# Patient Record
Sex: MALE | Race: WHITE | NOT HISPANIC OR LATINO | Employment: FULL TIME | URBAN - METROPOLITAN AREA
[De-identification: names, ages, dates, MRNs, and addresses within clinical notes are randomized per-mention and may not be internally consistent; named-entity substitution may affect disease eponyms.]

---

## 2023-10-25 PROBLEM — K64.9 HEMORRHOIDS: Status: ACTIVE | Noted: 2023-10-25

## 2023-10-25 PROBLEM — M54.2 NECK PAIN: Status: ACTIVE | Noted: 2023-10-25

## 2023-10-25 PROBLEM — M25.511 BILATERAL SHOULDER PAIN: Status: ACTIVE | Noted: 2023-10-25

## 2023-10-25 PROBLEM — M99.01 SEGMENTAL AND SOMATIC DYSFUNCTION OF CERVICAL REGION: Status: ACTIVE | Noted: 2023-10-25

## 2023-10-25 PROBLEM — J30.9 ALLERGIC RHINITIS: Status: ACTIVE | Noted: 2023-10-25

## 2023-10-25 PROBLEM — M77.8 FOREARM TENDONITIS: Status: ACTIVE | Noted: 2023-10-25

## 2023-10-25 PROBLEM — E78.5 DYSLIPIDEMIA: Status: ACTIVE | Noted: 2023-10-25

## 2023-10-25 PROBLEM — R06.09 DYSPNEA ON EXERTION: Status: ACTIVE | Noted: 2023-10-25

## 2023-10-25 PROBLEM — K59.00 CONSTIPATION: Status: ACTIVE | Noted: 2023-10-25

## 2023-10-25 PROBLEM — G44.209 MUSCLE TENSION HEADACHE: Status: ACTIVE | Noted: 2023-10-25

## 2023-10-25 PROBLEM — K21.9 GERD (GASTROESOPHAGEAL REFLUX DISEASE): Status: ACTIVE | Noted: 2023-10-25

## 2023-10-25 PROBLEM — E55.9 VITAMIN D DEFICIENCY: Status: ACTIVE | Noted: 2023-10-25

## 2023-10-25 PROBLEM — J01.90 SINUSITIS, ACUTE: Status: ACTIVE | Noted: 2023-10-25

## 2023-10-25 PROBLEM — N52.9 ERECTILE DYSFUNCTION: Status: ACTIVE | Noted: 2023-10-25

## 2023-10-25 PROBLEM — K58.0 IRRITABLE BOWEL SYNDROME WITH DIARRHEA: Status: ACTIVE | Noted: 2023-10-25

## 2023-10-25 PROBLEM — K60.2 ANAL FISSURE: Status: ACTIVE | Noted: 2023-10-25

## 2023-10-25 PROBLEM — R53.82 CHRONIC FATIGUE: Status: ACTIVE | Noted: 2023-10-25

## 2023-10-25 PROBLEM — R68.82 DECREASED SEX DRIVE: Status: ACTIVE | Noted: 2023-10-25

## 2023-10-25 PROBLEM — M25.512 BILATERAL SHOULDER PAIN: Status: ACTIVE | Noted: 2023-10-25

## 2023-10-25 PROBLEM — R73.09 ELEVATED GLUCOSE: Status: ACTIVE | Noted: 2023-10-25

## 2023-10-25 PROBLEM — K64.8 INTERNAL HEMORRHOIDS: Status: ACTIVE | Noted: 2023-10-25

## 2023-10-25 PROBLEM — K52.9 CHRONIC DIARRHEA: Status: ACTIVE | Noted: 2023-10-25

## 2023-10-25 PROBLEM — M72.2 PLANTAR FASCIITIS, BILATERAL: Status: ACTIVE | Noted: 2023-10-25

## 2023-10-26 ENCOUNTER — OFFICE VISIT (OUTPATIENT)
Dept: PRIMARY CARE | Facility: CLINIC | Age: 43
End: 2023-10-26
Payer: COMMERCIAL

## 2023-10-26 VITALS
DIASTOLIC BLOOD PRESSURE: 70 MMHG | SYSTOLIC BLOOD PRESSURE: 116 MMHG | WEIGHT: 201 LBS | BODY MASS INDEX: 33.45 KG/M2 | TEMPERATURE: 96.5 F | OXYGEN SATURATION: 97 % | HEART RATE: 82 BPM

## 2023-10-26 DIAGNOSIS — Z13.220 NEED FOR LIPID SCREENING: ICD-10-CM

## 2023-10-26 DIAGNOSIS — R10.31 GROIN PAIN, RIGHT: ICD-10-CM

## 2023-10-26 DIAGNOSIS — H60.541 ACUTE ECZEMATOID OTITIS EXTERNA OF RIGHT EAR: ICD-10-CM

## 2023-10-26 DIAGNOSIS — Z76.89 ENCOUNTER TO ESTABLISH CARE: Primary | ICD-10-CM

## 2023-10-26 DIAGNOSIS — Z87.19 HISTORY OF RIGHT INGUINAL HERNIA: ICD-10-CM

## 2023-10-26 PROCEDURE — 1036F TOBACCO NON-USER: CPT | Performed by: STUDENT IN AN ORGANIZED HEALTH CARE EDUCATION/TRAINING PROGRAM

## 2023-10-26 PROCEDURE — 3008F BODY MASS INDEX DOCD: CPT | Performed by: STUDENT IN AN ORGANIZED HEALTH CARE EDUCATION/TRAINING PROGRAM

## 2023-10-26 PROCEDURE — 99204 OFFICE O/P NEW MOD 45 MIN: CPT | Performed by: STUDENT IN AN ORGANIZED HEALTH CARE EDUCATION/TRAINING PROGRAM

## 2023-10-26 RX ORDER — DESIPRAMINE HYDROCHLORIDE 25 MG/1
25 TABLET ORAL DAILY
COMMUNITY
End: 2024-05-16

## 2023-10-26 RX ORDER — FLUOCINOLONE ACETONIDE 0.11 MG/ML
5 OIL AURICULAR (OTIC) 2 TIMES DAILY
Qty: 20 ML | Refills: 0 | Status: SHIPPED | OUTPATIENT
Start: 2023-10-26 | End: 2023-11-02

## 2023-10-26 RX ORDER — NEOMYCIN SULFATE, POLYMYXIN B SULFATE AND HYDROCORTISONE 10; 3.5; 1 MG/ML; MG/ML; [USP'U]/ML
SUSPENSION/ DROPS AURICULAR (OTIC)
COMMUNITY
Start: 2023-08-04

## 2023-10-26 ASSESSMENT — ENCOUNTER SYMPTOMS: ABDOMINAL PAIN: 1

## 2023-10-26 NOTE — ASSESSMENT & PLAN NOTE
Discussed suspicion of right ear eczema from previous steroid use. Given dermotic drops to soothe and prevent progression of eczema

## 2023-10-26 NOTE — PROGRESS NOTES
Subjective   Patient ID: Giovany Littlejohn is a 43 y.o. male who presents for Earache (LEFT IS BETTER, RIGHT WORSE) and HERNIA.    HPI     [facility] for right ear infection.   Given Corticosporin otic drops. Patient completed the course of antibiotics given. However since August patient has been experience right ear irritation with discharge and itching/pain.  Has not taken any OTC medication to treat it.   Patient has a history of inguinal hernia on the right side.   Recently was doing heavy lifting when he experienced shooting pain into groin and testicle. Hasn't noted any increased in size, redness or bulging.   He continues to have tenderness on palpation.        Review of Systems   HENT:  Positive for ear pain.    Gastrointestinal:  Positive for abdominal pain.   All other systems reviewed and are negative.      Objective   /70   Pulse 82   Temp 35.8 °C (96.5 °F)   Wt 91.2 kg (201 lb)   SpO2 97%   BMI 33.45 kg/m²     Physical Exam  Constitutional:       Appearance: Normal appearance.   HENT:      Head: Normocephalic and atraumatic.      Right Ear: Tympanic membrane, ear canal and external ear normal.      Left Ear: Tympanic membrane, ear canal and external ear normal.      Ears:      Comments: Right sided wax and dried skin  No canal erythema, no bulging, no fluid noted in front or behind TM.  No difficulty with congestion or ear popping     Nose: Nose normal. No congestion or rhinorrhea.   Eyes:      General: No scleral icterus.     Extraocular Movements: Extraocular movements intact.      Conjunctiva/sclera: Conjunctivae normal.   Pulmonary:      Effort: Pulmonary effort is normal.   Abdominal:      General: There is no distension.      Tenderness: There is abdominal tenderness.      Hernia: No hernia is present.      Comments: No bulging, no laxity in abdomen to note hernia  Point tenderness to palpation of groin   Musculoskeletal:         General: Normal range of  motion.      Cervical back: Normal range of motion.   Skin:     General: Skin is warm and dry.      Capillary Refill: Capillary refill takes less than 2 seconds.   Neurological:      General: No focal deficit present.      Mental Status: He is alert and oriented to person, place, and time. Mental status is at baseline.   Psychiatric:         Mood and Affect: Mood normal.         Behavior: Behavior normal.         Thought Content: Thought content normal.         Judgment: Judgment normal.         Assessment/Plan   Problem List Items Addressed This Visit       Acute eczematoid otitis externa of right ear     Discussed suspicion of right ear eczema from previous steroid use. Given dermotic drops to soothe and prevent progression of eczema            Relevant Medications    fluocinolone (DermOtic) 0.01 % ear drops    History of right inguinal hernia    Relevant Orders    Referral to General Surgery    BMI 33.0-33.9,adult    Relevant Orders    CBC    Lipid Panel    Hemoglobin A1C    Need for lipid screening    Relevant Orders    Lipid Panel    Groin pain, right     No inguinal hernia noted on exam today, suspicion of groin strain from heavy lifting.  Due to personal history of inguinal hernia referral to Dr. Calero for further assessment and management.  Rule out kidney stone with UA         Relevant Orders    Urinalysis with Reflex Microscopic     Other Visit Diagnoses       Encounter to establish care    -  Primary    Relevant Orders    CBC    Comprehensive Metabolic Panel    TSH with reflex to Free T4 if abnormal        Basic labs to establish care and return to clinic should symptoms continue or in 1 year for next annual wellness visit    I have personally reviewed all available pertinent labs, imaging, and consult notes with the patient.     All questions and concerns were addressed. Patient verbalizes understanding instructions and agrees with established plan of care.     Patient seen and discussed with   Charlie Shelley MD

## 2024-03-12 ENCOUNTER — TELEPHONE (OUTPATIENT)
Dept: PRIMARY CARE | Facility: CLINIC | Age: 44
End: 2024-03-12
Payer: COMMERCIAL

## 2024-03-12 NOTE — TELEPHONE ENCOUNTER
"Patient thinks he had the flu last week.  Came in contact with someone positive with Flu A.  Was starting to feel better but today has a lot of \"bubble\" in lungs.  Not sure what to do next.   "

## 2024-05-16 DIAGNOSIS — K58.0 IRRITABLE BOWEL SYNDROME WITH DIARRHEA: Primary | ICD-10-CM

## 2024-05-16 RX ORDER — DESIPRAMINE HYDROCHLORIDE 25 MG/1
25 TABLET ORAL DAILY
Qty: 90 TABLET | Refills: 0 | Status: SHIPPED | OUTPATIENT
Start: 2024-05-16

## 2024-08-13 DIAGNOSIS — K58.0 IRRITABLE BOWEL SYNDROME WITH DIARRHEA: ICD-10-CM

## 2024-08-13 RX ORDER — DESIPRAMINE HYDROCHLORIDE 25 MG/1
25 TABLET ORAL DAILY
Qty: 90 TABLET | Refills: 0 | Status: SHIPPED | OUTPATIENT
Start: 2024-08-13 | End: 2024-08-15

## 2024-08-15 DIAGNOSIS — K58.0 IRRITABLE BOWEL SYNDROME WITH DIARRHEA: ICD-10-CM

## 2024-08-15 RX ORDER — DESIPRAMINE HYDROCHLORIDE 25 MG/1
25 TABLET ORAL DAILY
Qty: 90 TABLET | Refills: 0 | Status: SHIPPED | OUTPATIENT
Start: 2024-08-15

## 2024-08-20 ENCOUNTER — OFFICE VISIT (OUTPATIENT)
Dept: PRIMARY CARE | Facility: CLINIC | Age: 44
End: 2024-08-20
Payer: COMMERCIAL

## 2024-08-20 VITALS
SYSTOLIC BLOOD PRESSURE: 126 MMHG | DIASTOLIC BLOOD PRESSURE: 80 MMHG | HEIGHT: 66 IN | HEART RATE: 92 BPM | TEMPERATURE: 97.9 F | OXYGEN SATURATION: 100 % | WEIGHT: 201 LBS | BODY MASS INDEX: 32.3 KG/M2

## 2024-08-20 DIAGNOSIS — H92.11 EAR DISCHARGE OF RIGHT EAR: Primary | ICD-10-CM

## 2024-08-20 PROCEDURE — 3008F BODY MASS INDEX DOCD: CPT | Performed by: STUDENT IN AN ORGANIZED HEALTH CARE EDUCATION/TRAINING PROGRAM

## 2024-08-20 PROCEDURE — 1036F TOBACCO NON-USER: CPT | Performed by: STUDENT IN AN ORGANIZED HEALTH CARE EDUCATION/TRAINING PROGRAM

## 2024-08-20 PROCEDURE — 99214 OFFICE O/P EST MOD 30 MIN: CPT | Performed by: STUDENT IN AN ORGANIZED HEALTH CARE EDUCATION/TRAINING PROGRAM

## 2024-08-20 RX ORDER — FLUOCINOLONE ACETONIDE 0.11 MG/ML
5 OIL AURICULAR (OTIC) 2 TIMES DAILY
Qty: 20 ML | Refills: 0 | Status: SHIPPED | OUTPATIENT
Start: 2024-08-20

## 2024-08-20 RX ORDER — NEOMYCIN SULFATE, POLYMYXIN B SULFATE AND HYDROCORTISONE 10; 3.5; 1 MG/ML; MG/ML; [USP'U]/ML
4 SUSPENSION/ DROPS AURICULAR (OTIC) 3 TIMES DAILY
Qty: 4.2 ML | Refills: 0 | Status: SHIPPED | OUTPATIENT
Start: 2024-08-20 | End: 2024-08-27

## 2024-08-20 ASSESSMENT — PATIENT HEALTH QUESTIONNAIRE - PHQ9
SUM OF ALL RESPONSES TO PHQ9 QUESTIONS 1 AND 2: 0
1. LITTLE INTEREST OR PLEASURE IN DOING THINGS: NOT AT ALL
2. FEELING DOWN, DEPRESSED OR HOPELESS: NOT AT ALL

## 2024-08-20 NOTE — PROGRESS NOTES
"Subjective   Patient ID: Giovany Littlejohn is a 44 y.o. male who presents for Earache.    HPI   Giovany is a 45 yo M presenting to the office for right ear drainage, itchiness. Patient states he has been dealing with ear issues for the past year. States his right ear has been draining on and off for the past year, had been prescribed fluocinolone ear drops at a prior office visit for this and has been taking them when he starts to feel the drainage or itchiness is bothersome. Reports he does find temporary relief of symptoms when he uses these drops, but that symptoms always come back. Feels like he has a scab in his left ear, oftentimes dry skin on the inner ear accumulates to the point patient has to constantly manually remove. Denies any ear pain, fever, chills, diminished hearing, headache.      Review of Systems  All pertinent positive symptoms are included in the history of present illness.  All other systems have been reviewed and are negative and noncontributory to this patient's current ailments.       Objective   /80   Pulse 92   Temp 36.6 °C (97.9 °F)   Ht 1.664 m (5' 5.5\")   Wt 91.2 kg (201 lb)   SpO2 100%   BMI 32.94 kg/m²     Physical Exam  CONSTITUTIONAL - well nourished, well developed, looks like stated age, in no acute distress, not ill-appearing, and not tired appearing  SKIN - normal skin color and pigmentation, normal skin turgor without rash, lesions, or nodules visualized  HEAD - no trauma, normocephalic  EYES - normal external exam  EARS - R TM and ear canal erythema, mild cerumen impaction. L TM normal, scab noted on inferior external L ear  CHEST - clear to auscultation, no wheezing, no crackles and no rales, good effort  CARDIAC - regular rate and regular rhythm, no skipped beats, no murmur  EXTREMITIES - no obvious or evident edema, no obvious or evident deformities  NEUROLOGICAL - alert, oriented and no focal signs  PSYCHIATRIC - alert, pleasant and cordial, " age-appropriate    Assessment/Plan   Problem List Items Addressed This Visit    None  Visit Diagnoses         Codes    Ear discharge of right ear    -  Primary H92.11    Relevant Medications    neomycin-polymyxin-HC (Cortisporin) 3.5-10,000-1 mg/mL-unit/mL-% otic suspension    fluocinolone (DermOtic) 0.01 % ear drops    Other Relevant Orders    Referral to ENT          Ear discharge, R > L  - Concerning for otitis externa   - Cortisporin (neomycin-polymyxin) ear drops TID x 7 days - sent to patient's preferred pharmacy   - Will send in refill of fluocinolone ear drops, patient may take if still having symptoms after completing course of cortisporin   - Will refer to ENT for further evaluation at this time     Patient understands and is agreeable with current plan. All questions were answered at this visit. Please follow up with the office if any additional questions arise.      Follow up if symptoms worsen, otherwise follow up for annual physical exam.     Saul English, DO

## 2024-09-26 ENCOUNTER — APPOINTMENT (OUTPATIENT)
Dept: OTOLARYNGOLOGY | Facility: CLINIC | Age: 44
End: 2024-09-26
Payer: COMMERCIAL

## 2024-09-26 VITALS — HEIGHT: 66 IN | WEIGHT: 201 LBS | TEMPERATURE: 98.1 F | BODY MASS INDEX: 32.3 KG/M2

## 2024-09-26 DIAGNOSIS — H92.11 EAR DISCHARGE OF RIGHT EAR: ICD-10-CM

## 2024-09-26 DIAGNOSIS — H61.23 BILATERAL IMPACTED CERUMEN: ICD-10-CM

## 2024-09-26 DIAGNOSIS — H60.8X3 CHRONIC ECZEMATOUS OTITIS EXTERNA OF BOTH EARS: Primary | ICD-10-CM

## 2024-09-26 DIAGNOSIS — H93.13 TINNITUS OF BOTH EARS: ICD-10-CM

## 2024-09-26 PROCEDURE — 3008F BODY MASS INDEX DOCD: CPT | Performed by: OTOLARYNGOLOGY

## 2024-09-26 PROCEDURE — 99214 OFFICE O/P EST MOD 30 MIN: CPT | Performed by: OTOLARYNGOLOGY

## 2024-09-26 PROCEDURE — 1036F TOBACCO NON-USER: CPT | Performed by: OTOLARYNGOLOGY

## 2024-09-26 PROCEDURE — 69210 REMOVE IMPACTED EAR WAX UNI: CPT | Performed by: OTOLARYNGOLOGY

## 2024-09-26 RX ORDER — MOMETASONE FUROATE 1 MG/G
CREAM TOPICAL DAILY PRN
Qty: 15 G | Refills: 3 | Status: SHIPPED | OUTPATIENT
Start: 2024-09-26

## 2024-09-26 RX ORDER — FLUOCINOLONE ACETONIDE 0.11 MG/ML
OIL AURICULAR (OTIC)
Qty: 20 ML | Refills: 3 | Status: SHIPPED | OUTPATIENT
Start: 2024-09-26

## 2024-09-26 NOTE — PROGRESS NOTES
Subjective   Patient ID: Giovany Littlejohn is a 44 y.o. male  HPI  Patient is complaining of chronic itching in the ears bilaterally.  He complains of some drainage from the ears intermittently.  He has no fevers or chills or nausea or vomiting.  He does have a chronic history of bilateral nonpulsatile tinnitus.  Review of Systems   HENT:  Positive for ear discharge and tinnitus.        Objective   Physical Exam  The following elements of a brief ear nose and throat exam were performed: External ear canals and tympanic membranes, external nose and nasal passages, oral cavity, palpation of the neck, percussion of the face, palpation of the thyroid.    There is cerumen impaction bilaterally and this was cleared using speculum and curettes.  There is extensive eczema and flaking of the skin of the ear canals and of the external ears noted bilaterally.  The tympanic membranes are clear and mobile and the remainder of his exam was within normal limits.    Ear cerumen removal    Date/Time: 9/26/2024 3:59 PM    Performed by: Carmen Lanier MD  Authorized by: Carmen Lanier MD    Consent:     Consent obtained:  Verbal    Risks discussed:  Pain  Procedure details:     Location:  L ear and R ear    Procedure type: curette        Assessment/Plan   Diagnoses and all orders for this visit:  Chronic eczematous otitis externa of both ears (Primary)  -     fluocinolone (DermOtic) 0.01 % ear drops; Instill 5 drops in the affected ear once a day as needed for itching  -     mometasone (Elocon) 0.1 % cream; Apply topically once daily as needed (For itching).  Ear discharge of right ear  -     Referral to ENT  Bilateral impacted cerumen  -     Ear cerumen removal  Tinnitus of both ears  -     Tympanometry Only; Future  -     Comprehensive hearing test; Future     1.  Chronic severe eczema of the external ears and ear canals.  The patient was started on mometasone cream in addition to the DermOtic drops.  2.  Chronic bilateral nonpulsatile  tinnitus.  The patient was scheduled for an audiogram and tympanogram.  3.  Bilateral cerumen impaction cleared today.

## 2024-11-18 DIAGNOSIS — K58.0 IRRITABLE BOWEL SYNDROME WITH DIARRHEA: ICD-10-CM

## 2024-11-18 RX ORDER — DESIPRAMINE HYDROCHLORIDE 25 MG/1
25 TABLET ORAL DAILY
Qty: 90 TABLET | Refills: 0 | Status: SHIPPED | OUTPATIENT
Start: 2024-11-18

## 2024-12-20 ENCOUNTER — APPOINTMENT (OUTPATIENT)
Dept: GASTROENTEROLOGY | Facility: CLINIC | Age: 44
End: 2024-12-20
Payer: COMMERCIAL

## 2024-12-20 VITALS — WEIGHT: 191 LBS | HEIGHT: 66 IN | BODY MASS INDEX: 30.7 KG/M2

## 2024-12-20 DIAGNOSIS — K21.9 GASTROESOPHAGEAL REFLUX DISEASE, UNSPECIFIED WHETHER ESOPHAGITIS PRESENT: Primary | ICD-10-CM

## 2024-12-20 DIAGNOSIS — K58.0 IRRITABLE BOWEL SYNDROME WITH DIARRHEA: ICD-10-CM

## 2024-12-20 PROCEDURE — 3008F BODY MASS INDEX DOCD: CPT | Performed by: INTERNAL MEDICINE

## 2024-12-20 PROCEDURE — 99213 OFFICE O/P EST LOW 20 MIN: CPT | Performed by: INTERNAL MEDICINE

## 2024-12-20 RX ORDER — HYDROCORTISONE 25 MG/G
CREAM TOPICAL AS NEEDED
COMMUNITY
Start: 2022-07-06

## 2024-12-20 RX ORDER — OMEPRAZOLE 20 MG/1
20 TABLET, DELAYED RELEASE ORAL DAILY
Qty: 90 TABLET | Refills: 3 | Status: SHIPPED | OUTPATIENT
Start: 2024-12-20

## 2024-12-20 RX ORDER — DESIPRAMINE HYDROCHLORIDE 25 MG/1
25 TABLET ORAL DAILY
Qty: 90 TABLET | Refills: 3 | Status: SHIPPED | OUTPATIENT
Start: 2024-12-20

## 2024-12-20 RX ORDER — OMEPRAZOLE 20 MG/1
1 TABLET, DELAYED RELEASE ORAL DAILY
COMMUNITY
Start: 2020-11-24 | End: 2024-12-20 | Stop reason: SDUPTHER

## 2024-12-20 RX ORDER — PSYLLIUM HUSK 0.4 G
CAPSULE ORAL
COMMUNITY

## 2024-12-20 RX ORDER — MULTIVITAMIN
TABLET ORAL
COMMUNITY

## 2024-12-20 NOTE — PROGRESS NOTES
REASON FOR VISIT: Follow-up IBS and GERD    HPI:  Giovany Littlejohn is a 44 y.o. male with a past medical history of diarrhea-predominant IBS as well as GERD here for follow-up.  Managed on desipramine 25 mg daily as well as omeprazole 20 mg daily.  No diarrhea or abdominal cramping.  No dysphagia.  Heartburn well-controlled.  Last upper endoscopy and colonoscopy were greater than 20 years ago.  No family history of colorectal cancer.  No rectal bleeding.          PRIOR ENDOSCOPY    PAST MEDICAL HISTORY  Past Medical History:   Diagnosis Date    Acute ethmoidal sinusitis, unspecified 01/03/2018    Acute non-recurrent ethmoidal sinusitis    Personal history of other infectious and parasitic diseases 10/26/2022    History of candidiasis of mouth    Personal history of other specified conditions 02/11/2019    History of shortness of breath    Personal history of other specified conditions 02/04/2019    History of tachycardia    Personal history of other specified conditions 02/11/2019    History of chest pain at rest    Tension-type headache, unspecified, not intractable 11/07/2019    Muscle tension headache       PAST SURGICAL HISTORY  Past Surgical History:   Procedure Laterality Date    APPENDECTOMY  06/29/2017    Appendectomy    BLADDER SURGERY  06/29/2017    Bladder Surgery    HERNIA REPAIR  06/29/2017    Hernia Repair    KNEE SURGERY  06/29/2017    Knee Surgery    NOSE SURGERY  06/29/2017    Nose Surgery    OTHER SURGICAL HISTORY  11/24/2020    Esophagogastroduodenoscopy    OTHER SURGICAL HISTORY  11/24/2020    Vasectomy    OTHER SURGICAL HISTORY  11/24/2020    Gallbladder surgery    OTHER SURGICAL HISTORY  11/24/2020    Eye surgery       FAMILY HISTORY  Family History   Problem Relation Name Age of Onset    Cancer Other         SOCIAL HISTORY  Social History     Tobacco Use    Smoking status: Never    Smokeless tobacco: Never   Substance Use Topics    Alcohol use: Yes       REVIEW OF SYSTEMS  CONSTITUTIONAL:  "negative for fever, chills, fatigue, or unintentional weight loss,   HEENT: negative for icteric sclera, eye pain/redness, or changes in vision/hearing  RESPIRATORY: negative for cough, hemoptysis, wheezing, orthopnea, or dyspnea on exertion  CARDIOVASCULAR: negative for chest pain, palpitations, or syncope   GASTROINTESTINAL: as noted per HPI  GENITOURINARY: negative for dysuria, polyuria, incontinence, or hematuria  MUSCULOSKELETAL: negative for arthralgia, myalgia, or joint swelling/stiffness   INTEGUMENTARY/SKIN: negative jaundice, rash, or skin lesion  HEMATOLOGIC/LYMPHATIC: negative for prolonged bleeding, easy bruising, or swollen lymph nodes  ENDOCRINE: negative for cold/heat intolerance, polydipsia, polyuria, or goiter  NEUROLOGIC: negative for headaches, dizziness, tremor, or gait abnormality  PSYCHIATRIC: negative for anxiety, depression, personality changes, or sleep disturbances      A 10 point review of systems was completed and was otherwise negative.    ALLERGIES  No Known Allergies    MEDICATIONS  Current Outpatient Medications   Medication Sig Dispense Refill    hydrocortisone 2.5 % cream Apply topically if needed for irritation (prn).      mometasone (Elocon) 0.1 % cream Apply topically once daily as needed (For itching). 15 g 3    multivitamin tablet Take by mouth once daily.      psyllium (MetamuciL) 0.4 gram capsule Take by mouth.      desipramine (Norpramin) 25 mg tablet Take 1 tablet (25 mg) by mouth once daily. LAST REFILL TILL APPT IS MADE 90 tablet 3    fluocinolone (DermOtic) 0.01 % ear drops Administer 5 drops into the right ear 2 times a day. 20 mL 0    fluocinolone (DermOtic) 0.01 % ear drops Instill 5 drops in the affected ear once a day as needed for itching 20 mL 3    omeprazole OTC (PriLOSEC OTC) 20 mg EC tablet Take 1 tablet (20 mg) by mouth once daily. 90 tablet 3     No current facility-administered medications for this visit.       VITALS  Ht 1.664 m (5' 5.5\")   Wt 86.6 kg " (191 lb)   BMI 31.30 kg/m²      PHYSICAL EXAM  Alert and oriented in no acute distress    ASSESSMENT/ PLAN  Patient with chronic GERD maintained on low-dose omeprazole 20 mg daily.  No alarm symptoms including no dysphagia.  IBS with diarrhea predominant symptoms controlled with desipramine 25 mg daily which we will continue.  I reminded him that he will be due for screening colonoscopy in 1 year.  I recommended upper endoscopy at the same time for Manuel's screening.  He is in agreement with the plan will see me then in follow-up in about a year.        Signature: Lona Lanier MD    Date: 12/20/2024  Time: 3:20 PM

## 2025-01-17 ENCOUNTER — TELEPHONE (OUTPATIENT)
Dept: PRIMARY CARE | Facility: CLINIC | Age: 45
End: 2025-01-17
Payer: COMMERCIAL

## 2025-01-17 DIAGNOSIS — Z00.00 ANNUAL PHYSICAL EXAM: Primary | ICD-10-CM

## 2025-01-17 DIAGNOSIS — Z11.59 NEED FOR HEPATITIS C SCREENING TEST: ICD-10-CM

## 2025-01-17 DIAGNOSIS — Z13.1 SCREENING FOR DIABETES MELLITUS: ICD-10-CM

## 2025-01-17 DIAGNOSIS — Z11.4 ENCOUNTER FOR SCREENING FOR HIV: ICD-10-CM

## 2025-01-17 NOTE — TELEPHONE ENCOUNTER
Previous [facility] patient, coming in for physical on 2/6. Can you order routine labs so patient can get them done prior to coming in to review? Thank you! Please send this back to me when its done!

## 2025-01-21 ENCOUNTER — TELEPHONE (OUTPATIENT)
Facility: CLINIC | Age: 45
End: 2025-01-21
Payer: COMMERCIAL

## 2025-01-22 ENCOUNTER — TELEPHONE (OUTPATIENT)
Dept: GASTROENTEROLOGY | Facility: CLINIC | Age: 45
End: 2025-01-22
Payer: COMMERCIAL

## 2025-01-22 ENCOUNTER — ANCILLARY PROCEDURE (OUTPATIENT)
Dept: URGENT CARE | Age: 45
End: 2025-01-22
Payer: COMMERCIAL

## 2025-01-22 ENCOUNTER — OFFICE VISIT (OUTPATIENT)
Dept: URGENT CARE | Age: 45
End: 2025-01-22
Payer: COMMERCIAL

## 2025-01-22 VITALS
RESPIRATION RATE: 19 BRPM | DIASTOLIC BLOOD PRESSURE: 87 MMHG | SYSTOLIC BLOOD PRESSURE: 125 MMHG | HEART RATE: 93 BPM | TEMPERATURE: 97.9 F | OXYGEN SATURATION: 98 % | BODY MASS INDEX: 31.96 KG/M2 | WEIGHT: 195 LBS

## 2025-01-22 DIAGNOSIS — K59.00 CONSTIPATION, UNSPECIFIED CONSTIPATION TYPE: ICD-10-CM

## 2025-01-22 DIAGNOSIS — R10.10 UPPER ABDOMINAL PAIN: ICD-10-CM

## 2025-01-22 DIAGNOSIS — K59.00 CONSTIPATION, UNSPECIFIED CONSTIPATION TYPE: Primary | ICD-10-CM

## 2025-01-22 PROCEDURE — 74018 RADEX ABDOMEN 1 VIEW: CPT | Performed by: REGISTERED NURSE

## 2025-01-22 ASSESSMENT — ENCOUNTER SYMPTOMS
FATIGUE: 0
CHILLS: 0
HEADACHES: 0
FEVER: 0
SORE THROAT: 0
RHINORRHEA: 0
NAUSEA: 0
SHORTNESS OF BREATH: 0
ABDOMINAL PAIN: 1
CONSTIPATION: 1
DIARRHEA: 0
COUGH: 0
VOMITING: 0

## 2025-01-22 NOTE — TELEPHONE ENCOUNTER
I called patient to discuss.  He was actually in urgent care.  KUB performed and does not show any obstruction or any significant stool burden.  He has full sensation and feels gassy.  Possibly infectious gastroenteritis with visceral hypersensitivity.  I suggested eating small meals as tolerated and monitoring symptoms at this point.  He is in agreement with the plan.  ------      He drink the mag citrate and felt sick, and said it ws less effective than the stool softeners night before.  He did not do the 2nd bottle because he said he thought it is not moving things along and is looking for next step?      Feels he needs xray or something  [phone]

## 2025-01-22 NOTE — PROGRESS NOTES
Subjective   Patient ID: Giovany Littlejohn is a 44 y.o. male. They present today with a chief complaint of Constipation (Pt has been constipated for 1 week took OTC laxatives, magnesium citrate and giving minor relief, last bowel was monday).    History of Present Illness  See mdm      History provided by:  Patient      Past Medical History  Allergies as of 01/22/2025    (No Known Allergies)       (Not in a hospital admission)       Past Medical History:   Diagnosis Date    Acute ethmoidal sinusitis, unspecified 01/03/2018    Acute non-recurrent ethmoidal sinusitis    Personal history of other infectious and parasitic diseases 10/26/2022    History of candidiasis of mouth    Personal history of other specified conditions 02/11/2019    History of shortness of breath    Personal history of other specified conditions 02/04/2019    History of tachycardia    Personal history of other specified conditions 02/11/2019    History of chest pain at rest    Tension-type headache, unspecified, not intractable 11/07/2019    Muscle tension headache       Past Surgical History:   Procedure Laterality Date    APPENDECTOMY  06/29/2017    Appendectomy    BLADDER SURGERY  06/29/2017    Bladder Surgery    HERNIA REPAIR  06/29/2017    Hernia Repair    KNEE SURGERY  06/29/2017    Knee Surgery    NOSE SURGERY  06/29/2017    Nose Surgery    OTHER SURGICAL HISTORY  11/24/2020    Esophagogastroduodenoscopy    OTHER SURGICAL HISTORY  11/24/2020    Vasectomy    OTHER SURGICAL HISTORY  11/24/2020    Gallbladder surgery    OTHER SURGICAL HISTORY  11/24/2020    Eye surgery        reports that he has never smoked. He has never used smokeless tobacco. He reports current alcohol use.    Review of Systems  Review of Systems   Constitutional:  Negative for chills, fatigue and fever.   HENT:  Negative for ear pain, rhinorrhea and sore throat.    Respiratory:  Negative for cough and shortness of breath.    Cardiovascular:  Negative for chest pain.    Gastrointestinal:  Positive for abdominal pain and constipation. Negative for diarrhea, nausea and vomiting.   Neurological:  Negative for headaches.                                  Objective    Vitals:    01/22/25 1249   BP: 125/87   BP Location: Left arm   Patient Position: Sitting   BP Cuff Size: Large adult   Pulse: 93   Resp: 19   Temp: 36.6 °C (97.9 °F)   TempSrc: Oral   SpO2: 98%   Weight: 88.5 kg (195 lb)     No LMP for male patient.    Physical Exam  Vitals and nursing note reviewed.   Constitutional:       Appearance: Normal appearance.   HENT:      Head: Normocephalic.      Right Ear: Tympanic membrane normal.      Left Ear: Tympanic membrane normal.      Nose: Nose normal.      Mouth/Throat:      Mouth: Mucous membranes are moist.   Eyes:      Pupils: Pupils are equal, round, and reactive to light.   Cardiovascular:      Rate and Rhythm: Normal rate and regular rhythm.   Pulmonary:      Effort: Pulmonary effort is normal.      Breath sounds: Normal breath sounds.   Abdominal:      General: Abdomen is flat. Bowel sounds are normal.      Palpations: Abdomen is soft.      Tenderness: There is no abdominal tenderness.   Skin:     General: Skin is warm and dry.      Capillary Refill: Capillary refill takes less than 2 seconds.   Neurological:      General: No focal deficit present.      Mental Status: He is alert.   Psychiatric:         Mood and Affect: Mood normal.         Procedures    Point of Care Test & Imaging Results from this visit  No results found for this visit on 01/22/25.   No results found.    Diagnostic study results (if any) were reviewed by Crystal L Severino, APRN-CNP.    Assessment/Plan   Allergies, medications, history, and pertinent labs/EKGs/Imaging reviewed by Crystal L Severino, APRN-CNP.     Medical Decision Making  45 yo male patient with a h/o constipation and IBS and is on a bowel routine daily presents with c/o constipation and upper abdominal pain.  Patient reports he was out  of [facility] for work last week and not eating the best and not sticking to his bowel routine.  jStates he began with constipation one week ago, he has taken OTC laxative and magnesium citrate which has given him minor relief.  Patient reports he has talked to his GI provider and reports he has had a couple episodes of watery diarrhea but feels as if he still has a lot stool in his upper colon.  States his GI provider told him to come in for a KUB to r/o obstruction.  He denies any n/v, fever/chills.   Abdominal xray is obtained; no obstruction seen.  Patient states he was on the phone with his GI provider who looks over the xray and also states he sees no obstructions.  Patient states he is going to reach back out to his GI provider and see if he needs to go to the ER for CT scan.      Orders and Diagnoses  Diagnoses and all orders for this visit:  Constipation, unspecified constipation type  -     XR abdomen 1 view; Future  Upper abdominal pain  -     XR abdomen 1 view; Future      Medical Admin Record      Patient disposition: Home    Electronically signed by Crystal L Severino, APRN-CNP  1:02 PM

## 2025-01-24 ENCOUNTER — TELEPHONE (OUTPATIENT)
Dept: GASTROENTEROLOGY | Facility: CLINIC | Age: 45
End: 2025-01-24
Payer: COMMERCIAL

## 2025-01-24 NOTE — TELEPHONE ENCOUNTER
Pt called stating he is getting better. And symptoms seem to be resolving.  Has had 2 solid movements and said he was calling surgery center to cancel. ( I did leave him a voicemail to call back to confirm that he will NOT be doing the EGD.    He did want to cancel EGD

## 2025-01-27 ENCOUNTER — APPOINTMENT (OUTPATIENT)
Dept: GASTROENTEROLOGY | Facility: EXTERNAL LOCATION | Age: 45
End: 2025-01-27
Payer: COMMERCIAL

## 2025-02-06 ENCOUNTER — APPOINTMENT (OUTPATIENT)
Dept: PRIMARY CARE | Facility: CLINIC | Age: 45
End: 2025-02-06
Payer: COMMERCIAL

## 2025-02-14 ENCOUNTER — APPOINTMENT (OUTPATIENT)
Dept: PRIMARY CARE | Facility: CLINIC | Age: 45
End: 2025-02-14
Payer: COMMERCIAL

## 2025-03-27 ENCOUNTER — APPOINTMENT (OUTPATIENT)
Dept: OTOLARYNGOLOGY | Facility: CLINIC | Age: 45
End: 2025-03-27
Payer: COMMERCIAL

## 2025-03-27 ENCOUNTER — APPOINTMENT (OUTPATIENT)
Dept: AUDIOLOGY | Facility: CLINIC | Age: 45
End: 2025-03-27
Payer: COMMERCIAL

## 2025-04-18 ENCOUNTER — OFFICE VISIT (OUTPATIENT)
Dept: URGENT CARE | Age: 45
End: 2025-04-18
Payer: COMMERCIAL

## 2025-04-18 ENCOUNTER — ANCILLARY PROCEDURE (OUTPATIENT)
Dept: URGENT CARE | Age: 45
End: 2025-04-18
Payer: COMMERCIAL

## 2025-04-18 VITALS
HEART RATE: 94 BPM | SYSTOLIC BLOOD PRESSURE: 124 MMHG | OXYGEN SATURATION: 98 % | DIASTOLIC BLOOD PRESSURE: 82 MMHG | WEIGHT: 195 LBS | TEMPERATURE: 97.9 F | HEIGHT: 66 IN | RESPIRATION RATE: 20 BRPM | BODY MASS INDEX: 31.34 KG/M2

## 2025-04-18 DIAGNOSIS — H04.551 OBSTRUCTION OF RIGHT LACRIMAL DUCT: ICD-10-CM

## 2025-04-18 DIAGNOSIS — M79.671 PAIN OF RIGHT HEEL: ICD-10-CM

## 2025-04-18 DIAGNOSIS — B07.0 PLANTAR WART OF RIGHT FOOT: ICD-10-CM

## 2025-04-18 DIAGNOSIS — M79.671 PAIN OF RIGHT HEEL: Primary | ICD-10-CM

## 2025-04-18 PROCEDURE — 73630 X-RAY EXAM OF FOOT: CPT | Mod: RIGHT SIDE | Performed by: REGISTERED NURSE

## 2025-04-18 RX ORDER — POLYMYXIN B SULFATE AND TRIMETHOPRIM 1; 10000 MG/ML; [USP'U]/ML
1 SOLUTION OPHTHALMIC EVERY 4 HOURS
Qty: 10 ML | Refills: 0 | Status: SHIPPED | OUTPATIENT
Start: 2025-04-18 | End: 2025-04-28

## 2025-04-18 ASSESSMENT — ENCOUNTER SYMPTOMS
EYE ITCHING: 1
WOUND: 1
EYE PAIN: 1

## 2025-04-18 NOTE — PATIENT INSTRUCTIONS
Use drops as directed  Warm compresses to the eye  Tylenol/ibuprofen as needed    Follow up with podiatry for the plantar's wart

## 2025-04-18 NOTE — PROGRESS NOTES
"Subjective   Patient ID: Giovany Littlejohn is a 44 y.o. male. They present today with a chief complaint of eye irritation (Eye irritation x 2 weeks ) and pain in right heel (Pain in right heel unsure if there is a splinter in heel /).    History of Present Illness  See mdm      History provided by:  Patient      Past Medical History  Allergies as of 04/18/2025    (No Known Allergies)       Prescriptions Prior to Admission[1]     Medical History[2]    Surgical History[3]     reports that he has never smoked. He has never used smokeless tobacco. He reports current alcohol use.    Review of Systems  Review of Systems   Eyes:  Positive for pain and itching.   Skin:  Positive for wound (right heel).   All other systems reviewed and are negative.                                 Objective    Vitals:    04/18/25 1509   BP: 124/82   BP Location: Left arm   Patient Position: Sitting   BP Cuff Size: Adult   Pulse: 94   Resp: 20   Temp: 36.6 °C (97.9 °F)   TempSrc: Oral   SpO2: 98%   Weight: 88.5 kg (195 lb)   Height: 1.664 m (5' 5.5\")     No LMP for male patient.    Physical Exam  Vitals and nursing note reviewed.   Eyes:      General: Lids are normal.         Right eye: No discharge or hordeolum.     Musculoskeletal:        Feet:    Feet:      Right foot:      Skin integrity: Ulcer present.         Procedures    Point of Care Test & Imaging Results from this visit  No results found for this visit on 04/18/25.   Imaging  XR foot right 3+ views  Result Date: 4/18/2025  Small plantar calcaneal spur     MACRO: None   Signed by: Sindhu Stewart 4/18/2025 3:42 PM Dictation workstation:   [id number]      Cardiology, Vascular, and Other Imaging  No other imaging results found for the past 2 days      Diagnostic study results (if any) were reviewed by Crystal L Severino, APRN-CNP.    Assessment/Plan   Allergies, medications, history, and pertinent labs/EKGs/Imaging reviewed by Crystal L Severino, APRN-CNP.     Medical Decision " Making  44-year-old male patient presents with complaints of eye irritation to the right corner of his eye x 2 weeks.  Patient states he believes he may have a blocked tear duct.  He denies any drainage or redness but states that he is having discomfort to the area.  He states he has been using warm compresses but it does not seem to be getting any better.  He is also concerned that he may have a splinter or other foreign body in the heel of his right foot.  He states that he has been having some discomfort when he is weightbearing and walking.  He states he took a mack and was using it to remove all of the calluses off the heel of his foot believes he may have sanded down what ever is there but is still continuing with pain.  My assessment is as noted, patient will be treated with antibiotic eyedrops.  Right foot x-ray is obtained to rule out foreign body, negative for foreign body, does show plantar's wart; patient is to follow up with podiatry.  At time of discharge patient was clinically well-appearing and HDS for outpatient management. The patient and/or family was educated regarding diagnosis, supportive care, OTC and Rx medications. The patient and/or family was given the opportunity to ask questions prior to discharge.  They verbalized understanding of my discussion of the plans for treatment, expected course, indications to return to  or seek further evaluation in ED, and the need for timely follow up as directed.   They were provided with a work/school excuse if requested.      Orders and Diagnoses  Diagnoses and all orders for this visit:  Pain of right heel  -     XR foot right 3+ views; Future  Obstruction of right lacrimal duct  -     polymyxin B sulf-trimethoprim (Polytrim) ophthalmic solution; Administer 1 drop into both eyes every 4 hours for 10 days.  Plantar wart of right foot  Use drops as directed  Warm compresses to the eye  Tylenol/ibuprofen as needed      Medical Admin Record      Patient  disposition: Home    Electronically signed by Crystal L Severino, APRN-CNP  3:55 PM           [1] (Not in a hospital admission)   [2]   Past Medical History:  Diagnosis Date    Acute ethmoidal sinusitis, unspecified 01/03/2018    Acute non-recurrent ethmoidal sinusitis    Personal history of other infectious and parasitic diseases 10/26/2022    History of candidiasis of mouth    Personal history of other specified conditions 02/11/2019    History of shortness of breath    Personal history of other specified conditions 02/04/2019    History of tachycardia    Personal history of other specified conditions 02/11/2019    History of chest pain at rest    Tension-type headache, unspecified, not intractable 11/07/2019    Muscle tension headache   [3]   Past Surgical History:  Procedure Laterality Date    APPENDECTOMY  06/29/2017    Appendectomy    BLADDER SURGERY  06/29/2017    Bladder Surgery    HERNIA REPAIR  06/29/2017    Hernia Repair    KNEE SURGERY  06/29/2017    Knee Surgery    NOSE SURGERY  06/29/2017    Nose Surgery    OTHER SURGICAL HISTORY  11/24/2020    Esophagogastroduodenoscopy    OTHER SURGICAL HISTORY  11/24/2020    Vasectomy    OTHER SURGICAL HISTORY  11/24/2020    Gallbladder surgery    OTHER SURGICAL HISTORY  11/24/2020    Eye surgery

## 2025-05-07 DIAGNOSIS — Z12.11 COLON CANCER SCREENING: ICD-10-CM

## 2025-05-07 RX ORDER — POLYETHYLENE GLYCOL 3350, SODIUM SULFATE ANHYDROUS, SODIUM BICARBONATE, SODIUM CHLORIDE, POTASSIUM CHLORIDE 236; 22.74; 6.74; 5.86; 2.97 G/4L; G/4L; G/4L; G/4L; G/4L
POWDER, FOR SOLUTION ORAL
Qty: 4000 ML | Refills: 0 | Status: SHIPPED | OUTPATIENT
Start: 2025-05-07

## 2025-07-18 ENCOUNTER — APPOINTMENT (OUTPATIENT)
Dept: GASTROENTEROLOGY | Facility: EXTERNAL LOCATION | Age: 45
End: 2025-07-18
Payer: COMMERCIAL

## 2025-07-18 DIAGNOSIS — D12.3 BENIGN NEOPLASM OF TRANSVERSE COLON: ICD-10-CM

## 2025-07-18 DIAGNOSIS — K21.9 CHRONIC GERD: ICD-10-CM

## 2025-07-18 DIAGNOSIS — K31.89 OTHER DISEASES OF STOMACH AND DUODENUM: Primary | ICD-10-CM

## 2025-07-18 DIAGNOSIS — Z12.11 ENCOUNTER FOR SCREENING FOR MALIGNANT NEOPLASM OF COLON: ICD-10-CM

## 2025-07-18 DIAGNOSIS — D12.5 BENIGN NEOPLASM OF SIGMOID COLON: ICD-10-CM

## 2025-07-18 PROCEDURE — 43239 EGD BIOPSY SINGLE/MULTIPLE: CPT | Performed by: INTERNAL MEDICINE

## 2025-07-18 PROCEDURE — 88305 TISSUE EXAM BY PATHOLOGIST: CPT | Performed by: PATHOLOGY

## 2025-07-18 PROCEDURE — 0753T DGTZ GLS MCRSCP SLD LEVEL IV: CPT

## 2025-07-18 PROCEDURE — 45385 COLONOSCOPY W/LESION REMOVAL: CPT | Performed by: INTERNAL MEDICINE

## 2025-07-18 PROCEDURE — 88305 TISSUE EXAM BY PATHOLOGIST: CPT

## 2025-07-19 ENCOUNTER — LAB REQUISITION (OUTPATIENT)
Dept: LAB | Facility: HOSPITAL | Age: 45
End: 2025-07-19
Payer: COMMERCIAL

## 2025-07-25 LAB
LABORATORY COMMENT REPORT: NORMAL
PATH REPORT.FINAL DX SPEC: NORMAL
PATH REPORT.GROSS SPEC: NORMAL
PATH REPORT.MICROSCOPIC SPEC OTHER STN: NORMAL
PATH REPORT.RELEVANT HX SPEC: NORMAL
PATH REPORT.TOTAL CANCER: NORMAL